# Patient Record
Sex: FEMALE | Race: BLACK OR AFRICAN AMERICAN | Employment: PART TIME | URBAN - METROPOLITAN AREA
[De-identification: names, ages, dates, MRNs, and addresses within clinical notes are randomized per-mention and may not be internally consistent; named-entity substitution may affect disease eponyms.]

---

## 2020-06-30 LAB — PROLACTIN: 37.8

## 2020-07-07 RX ORDER — ETONOGESTREL AND ETHINYL ESTRADIOL 11.7; 2.7 MG/1; MG/1
INSERT, EXTENDED RELEASE VAGINAL
COMMUNITY
Start: 2020-06-26

## 2020-07-07 RX ORDER — SERTRALINE HYDROCHLORIDE 100 MG/1
TABLET, FILM COATED ORAL
COMMUNITY
Start: 2020-06-27

## 2020-07-08 ENCOUNTER — OFFICE VISIT (OUTPATIENT)
Dept: ENDOCRINOLOGY | Age: 25
End: 2020-07-08
Payer: COMMERCIAL

## 2020-07-08 VITALS
HEART RATE: 84 BPM | TEMPERATURE: 97.7 F | WEIGHT: 206 LBS | DIASTOLIC BLOOD PRESSURE: 76 MMHG | OXYGEN SATURATION: 98 % | SYSTOLIC BLOOD PRESSURE: 120 MMHG | HEIGHT: 71 IN | BODY MASS INDEX: 28.84 KG/M2

## 2020-07-08 PROBLEM — E22.1 HYPERPROLACTINEMIA (HCC): Status: ACTIVE | Noted: 2020-07-08

## 2020-07-08 PROBLEM — N97.9 INABILITY TO CONCEIVE, FEMALE: Status: ACTIVE | Noted: 2020-07-08

## 2020-07-08 PROCEDURE — 99204 OFFICE O/P NEW MOD 45 MIN: CPT | Performed by: INTERNAL MEDICINE

## 2020-07-08 NOTE — PROGRESS NOTES
Patient ID:   Mitzi Guevara is a 30 y.o. female    Chief Complaint:   Mitzi Guevara is seen for initial evaluation of elevated prolactin levels. GYN: Dr Shanell Vyas  Subjective:   She was on OCPs till June 25th 2020 and work done on June 30 th, 2020 showed   Prolactin 37.8, HCG <1, TSH 2.05, FT4 1.22    She had dizzy spells around periods  For that reasons blood work was done. This blood work was done 3-4 days before Nuvaring     She tried to conceive from June 2019 till May 2020 and was unable to conceive   She is not interested in pregnancy as she is back to school   Last pregnancy : She has a 11year old daughter   Mentsrual cycles are irregular since Feb 2020      Denies breast discharge, tenderness, piercing     Reports normal mammogram in June 2020     She is on Zoloft     No family history of hyperprolactinemia   Mother is on levothyroxine replacement     The following portions of the patient's history were reviewed and updated as appropriate:        Family History   Problem Relation Age of Onset    Cancer Mother     High Blood Pressure Father     Asthma Brother     COPD Maternal Grandmother     Colon Cancer Maternal Grandfather     Brain Cancer Paternal Grandmother     Ovarian Cancer Maternal Aunt          Social History     Socioeconomic History    Marital status:      Spouse name: Not on file    Number of children: Not on file    Years of education: Not on file    Highest education level: Not on file   Occupational History    Not on file   Social Needs    Financial resource strain: Not on file    Food insecurity     Worry: Not on file     Inability: Not on file    Transportation needs     Medical: Not on file     Non-medical: Not on file   Tobacco Use    Smoking status: Never Smoker    Smokeless tobacco: Never Used   Substance and Sexual Activity    Alcohol use:  Yes    Drug use: Yes     Types: Marijuana     Comment: once month    Sexual activity: Yes     Partners: Male   Lifestyle    Physical activity     Days per week: Not on file     Minutes per session: Not on file    Stress: Not on file   Relationships    Social connections     Talks on phone: Not on file     Gets together: Not on file     Attends Gnosticist service: Not on file     Active member of club or organization: Not on file     Attends meetings of clubs or organizations: Not on file     Relationship status: Not on file    Intimate partner violence     Fear of current or ex partner: Not on file     Emotionally abused: Not on file     Physically abused: Not on file     Forced sexual activity: Not on file   Other Topics Concern    Not on file   Social History Narrative    Not on file         Past Medical History:   Diagnosis Date    Prolactin increased ([facility] [address]        History reviewed. No pertinent surgical history. No Known Allergies      Current Outpatient Medications:     etonogestrel-ethinyl estradiol (NUVARING) 0.12-0.015 MG/24HR vaginal ring, INSERT 1 RING VAGINALLY ONCE MONTHLY. LEAVE IN PLACE FOR 3 WEEKS THEN REMOVE FOR 1 WEEK, Disp: , Rfl:     sertraline (ZOLOFT) 100 MG tablet, TK 1 T PO D, Disp: , Rfl:       Review of Systems:  Constitutional: Negative for fever, chills, and unexpected weight change. HENT: Negative for congestion, ear pain, rhinorrhea,  sore throat and trouble swallowing. Eyes: Negative for photophobia, redness, itching. Respiratory: Negative for cough, shortness of breath and sputum. Cardiovascular: Negative for chest pain, palpitations and leg swelling. Gastrointestinal: Negative for nausea, vomiting, abdominal pain, diarrhea, constipation. Endocrine: Negative for cold intolerance, heat intolerance, polydipsia, polyphagia and polyuria. Genitourinary: Negative for dysuria, urgency, frequency, hematuria and flank pain. Musculoskeletal: Negative for myalgias, back pain, arthralgias and neck pain. Skin/Nail: Negative for rash, itching. Normal nails.    Neurological: Negative for seizures, weakness, light-headedness, numbness and headaches. Hematological/ Lymph nodes: Negative for adenopathy. Does not bruise/bleed easily. Psychiatric/Behavioral: Negative for suicidal ideas, depression, anxiety, sleep disturbance and decreased concentration. Objective:   Physical Exam:  /76 (Site: Right Upper Arm, Position: Sitting, Cuff Size: Medium Adult)   Pulse 84   Temp 97.7 °F (36.5 °C) (Temporal)   Ht 5' 11\" (1.803 m)   Wt 206 lb (93.4 kg)   LMP 06/25/2020   SpO2 98%   Breastfeeding No   BMI 28.73 kg/m²   Constitutional: Patient is oriented to person, place, and time. Patient appears well-developed and well-nourished. HENT:    Head: Normocephalic and atraumatic. Eyes: Conjunctivae and EOM are normal.     Neck: Normal range of motion. Thyroid normal.   Cardiovascular: Normal rate, regular rhythm and normal heart sounds. No murmur. Pulmonary/Chest: Effort normal and breath sounds normal.   Abdominal: Soft. Bowel sounds are normal.   Musculoskeletal: Normal range of motion. Neurological: Patient is alert and oriented to person, place, and time. Patient has normal reflexes. Skin: Skin is warm and dry. Psychiatric: Patient has a normal mood and affect. Patient behavior is normal.     Lab Review:    Abstract on 07/08/2020   Component Date Value Ref Range Status    Prolactin 06/30/2020 37.8   Final           Assessment and Plan     Antonio was seen today for consultation. Diagnoses and all orders for this visit:    Hyperprolactinemia ([facility] [address]  -     MRI [facility]; Future  -     Comprehensive Metabolic Panel; Future  -     TSH without Reflex; Future  -     T4, Free; Future  -     T3, Free; Future  -     Prolactin; Future  -     Miscellaneous Sendout 1; Future    Inability to conceive, female  -     Comprehensive Metabolic Panel; Future  -     TSH without Reflex; Future  -     T4, Free; Future  -     T3, Free; Future  -     Prolactin;  Future  - Miscellaneous Sendout 1; Future        1: Hyperprolactinemia     Prolactin is slightly high but it does explain infertility over the past year     She avelar snot have plan to conceive , treatment may not be indicated unless there eis no pituitary tumor     MRI pituitary gland now  to check for hypothalamic/pituitary disease     Her Nuvaring can also contribute to increase prolactin     Check for CCP, TSH, FT4, FT3, Prolactin, macroprolactin before next visit     Follow up in 6 months for repeat levels     If treatment is indicated, that pituitary tumor, than start cabergoline and follow up in 3 months     Electronically signed by Treasure Leonard MD on 7/8/2020 at 10:58 AM

## 2020-07-09 ENCOUNTER — TELEPHONE (OUTPATIENT)
Dept: ENDOCRINOLOGY | Age: 25
End: 2020-07-09

## 2020-07-09 NOTE — TELEPHONE ENCOUNTER
EDWINA called back Billie at [facility] needs a call back to help authorize the patients MRI of the Brain for tomorrow at 7am. She can be reached at [phone]

## 2020-07-09 NOTE — TELEPHONE ENCOUNTER
Pt called stated she has an MRI scheduled for 7am tomorrow and needs an authorization from her Insurance and was told to call our office to have it done. Elie stated they haven't heard back from our office. The cpt code is 3[address], the Location for the MRI of the Brain is scheduled at [address] [place] [address], contact name is Vijay at [phone].

## 2020-07-09 NOTE — TELEPHONE ENCOUNTER
Billie from [place], called states patient needs a call to go through to [phone] to set up approval. It will be faster this way so she can have her MRI tomorrow morning at 7am

## 2021-01-05 PROBLEM — F33.2 SEVERE EPISODE OF RECURRENT MAJOR DEPRESSIVE DISORDER, WITHOUT PSYCHOTIC FEATURES (HCC): Status: ACTIVE | Noted: 2018-11-26
